# Patient Record
Sex: FEMALE | Race: BLACK OR AFRICAN AMERICAN | NOT HISPANIC OR LATINO | Employment: OTHER | URBAN - METROPOLITAN AREA
[De-identification: names, ages, dates, MRNs, and addresses within clinical notes are randomized per-mention and may not be internally consistent; named-entity substitution may affect disease eponyms.]

---

## 2024-05-03 ENCOUNTER — APPOINTMENT (OUTPATIENT)
Dept: SURGICAL ONCOLOGY | Facility: HOSPITAL | Age: 39
End: 2024-05-03
Payer: MEDICAID

## 2024-05-03 ENCOUNTER — HOSPITAL ENCOUNTER (OUTPATIENT)
Dept: RADIOLOGY | Facility: EXTERNAL LOCATION | Age: 39
Discharge: HOME | End: 2024-05-03
Payer: COMMERCIAL

## 2024-05-03 DIAGNOSIS — R92.8 ABNORMAL FINDING ON BREAST IMAGING: Primary | ICD-10-CM

## 2024-05-05 NOTE — PROGRESS NOTES
[facility]  Jennifer Oconnell female   1985 38 y.o.  [id number]      Chief Complaint  New patient, biopsy consultation.    History Of Present Illness  Jennifer Oconnell is a very pleasant 38 y.o. AA female seen in the breast center for biopsy consultation. States right breast tenderness began in February and the the last week she noticed the area become red and tender. She has not attempted to treat the area with over the counter measures. Denies fevers or chills, denies drainage. She denies breast surgery or biopsy. She has no family history of breast cancer.     BREAST IMAGIN2024 Bilateral diagnostic mammogram and right breast ultrasound, BI-RADS Category 3, 7 :00 10 cm from the nipple,  5.8 x 5.4 x 4.7 cm, irregular heterogeneous collection with mobile debris with two prominent lymph nodes in the right axilla with 4 mm of cortical thickening. 2 morphologically normal appearing lymph nodes indicating probably benign probable abscess formation in the right breast at the site of patient's painful palpable lump with likely reactive right axillary lymphadenopathy, warranting Short-term follow-up ultrasound in 3 months.    REPRODUCTIVE HISTORY: menarche age 10, , first birth age 19, idd not breastfeed, no OCP's, premenopausal, LMP 2024, scattered fibroglandular tissue     FAMILY CANCER HISTORY:   No family history of cancers    Review of Systems  Constitutional:  Negative for appetite change, fatigue, fever and unexpected weight change.   HENT:  Negative for ear pain, hearing loss, nosebleeds, sore throat and trouble swallowing.    Eyes:  Negative for discharge, itching and visual disturbance.   Breast: As stated in HPI.  Respiratory:  Negative for cough, chest tightness and shortness of breath.    Cardiovascular:  Negative for chest pain, palpitations and leg swelling.   Gastrointestinal:  Negative for abdominal pain, constipation, diarrhea and nausea.   Endocrine: Negative for cold  intolerance and heat intolerance.   Genitourinary:  Negative for dysuria, frequency, hematuria, pelvic pain and vaginal bleeding.   Musculoskeletal:  Negative for arthralgias, back pain, gait problem, joint swelling and myalgias.   Skin:  Negative for color change and rash.   Allergic/Immunologic: Negative for environmental allergies and food allergies.   Neurological:  Negative for dizziness, tremors, speech difficulty, weakness, numbness and headaches.   Hematological:  Does not bruise/bleed easily.   Psychiatric/Behavioral:  Negative for agitation, dysphoric mood and sleep disturbance. The patient is not nervous/anxious.       Past Medical History  She has a past medical history of Other specified health status.    Surgical History  She has a past surgical history that includes Other surgical history (05/11/2022) and Other surgical history (05/11/2022).    Family History  Cancer-related family history is not on file.     Social History  She reports that she has never smoked. She has never been exposed to tobacco smoke. She has never used smokeless tobacco. No history on file for alcohol use and drug use.    Allergies  Patient has no known allergies.    Medications  Current Outpatient Medications   Medication Instructions    doxycycline (VIBRA-TABS) 100 mg, oral, 2 times daily, Take with a full glass of water and do not lie down for at least 30 minutes after.       Last Recorded Vitals  Vitals:    05/07/24 0943   BP: 138/87   Pulse: 64   Resp: 17   Temp: 37 °C (98.6 °F)       Physical Exam  Physical Exam  Chest:           Patient is alert and oriented x3 and in a relaxed and appropriate mood. Her gait is steady and hand grasps are equal. Sclera is clear. The breasts are nearly symmetrical. The right breast, 3:00-9:00 thickened tissue and erythema with fluctuance, 8 cm x 10 cm.  The rest of the tissue is soft without palpable abnormalities, discrete nodules or masses. The left skin and nipple appear normal. There  is no cervical, supraclavicular or axillary lymphadenopathy. Heart rate and rhythm normal, S1 and S2 appreciated. The lungs are clear to auscultation bilaterally. Abdomen is soft and non-tender.     Procedure:  The right breast inferior central was prepped with alcohol. The site was verbally confirmed with the patient. Lidocaine 1% without epi 0.6 ml to site was injected. The site was then prepped with chlorhexidine. An 18g needle was used to access the fluid collection. Significant tenderness to right breast abscess. We discussed pro's and con's with aspiration. We ultimately proceeded with aspiration of the area. 20 ml of purulent drainage was removed and sent for culture. Patient tolerated the procedure without difficulty and the needle site was dressed with dry dressing.        Relevant Results and Imaging    I explained the results in depth, along with suggested explanation for follow up recommendations based on the testing results. BI-RADS Category 3        Visit Diagnosis  1. Abnormal finding on breast imaging  BI mammo bilateral diagnostic tomosynthesis    BI US breast limited right    BI US guided breast localization and biopsy right    Clinic Appointment Request    Clinic Appointment Request (with u/s)    BI US breast limited right    doxycycline (Vibra-Tabs) 100 mg tablet    Tissue/Wound Culture/Smear    Tissue/Wound Culture/Smear    Tissue/Wound Culture/Smear    Tissue/Wound Culture/Smear      2. Mass of right breast, unspecified quadrant  BI mammo bilateral diagnostic tomosynthesis    BI US breast limited right    BI US guided breast localization and biopsy right    Clinic Appointment Request    Clinic Appointment Request (with u/s)    BI US breast limited right    doxycycline (Vibra-Tabs) 100 mg tablet    Tissue/Wound Culture/Smear    Tissue/Wound Culture/Smear    Tissue/Wound Culture/Smear    Tissue/Wound Culture/Smear      3. Abscess              Assessment/Plan    Discussed possible  infection/inflammatory process. Agreed on antibiotics. She will start Doxycycline 100 mg PO BID x 14 days.  Breast pain education provided. Encouraged warm compresses three times a day for 20 minutes each. Patient will follow up in 1 week.  Will follow up with US for resolution in 3 months.     Patient Discussion/Summary   Your clinical examination and imaging are stable. Please return in 1 week for an office visit or sooner if you have any problems or concerns.     You can see your health information, review clinical summaries from office visits & test results online when you follow your health with MY  Chart, a personal health record. To sign up go to [email or web address]. If you need assistance with signing up or trouble getting into your account call [employer] Patient Line 24/7 at [phone].    My office phone number is [phone] if you need to get in touch with me or have additional questions or concerns. Thank you for choosing [facility] and trusting me as your healthcare provider. I look forward to seeing you again at your next office visit. I am honored to be a provider on your health care team and I remain dedicated to helping you achieve your health goals.    Meghan Downs, APRN-CNP

## 2024-05-06 ENCOUNTER — HOSPITAL ENCOUNTER (OUTPATIENT)
Dept: RADIOLOGY | Facility: EXTERNAL LOCATION | Age: 39
Discharge: HOME | End: 2024-05-06
Payer: COMMERCIAL

## 2024-05-06 DIAGNOSIS — R92.8 ABNORMAL FINDING ON BREAST IMAGING: ICD-10-CM

## 2024-05-06 PROBLEM — N63.10 MASS OF RIGHT BREAST: Status: ACTIVE | Noted: 2024-05-06

## 2024-05-07 ENCOUNTER — HOSPITAL ENCOUNTER (OUTPATIENT)
Dept: RADIOLOGY | Facility: HOSPITAL | Age: 39
Discharge: HOME | End: 2024-05-07
Payer: MEDICAID

## 2024-05-07 ENCOUNTER — OFFICE VISIT (OUTPATIENT)
Dept: SURGICAL ONCOLOGY | Facility: HOSPITAL | Age: 39
End: 2024-05-07
Payer: MEDICAID

## 2024-05-07 VITALS
WEIGHT: 214.29 LBS | SYSTOLIC BLOOD PRESSURE: 138 MMHG | BODY MASS INDEX: 40.46 KG/M2 | DIASTOLIC BLOOD PRESSURE: 87 MMHG | HEIGHT: 61 IN | HEART RATE: 64 BPM | RESPIRATION RATE: 17 BRPM | TEMPERATURE: 98.6 F

## 2024-05-07 VITALS — HEIGHT: 61 IN | WEIGHT: 214 LBS | BODY MASS INDEX: 40.4 KG/M2

## 2024-05-07 DIAGNOSIS — N63.10 MASS OF RIGHT BREAST, UNSPECIFIED QUADRANT: ICD-10-CM

## 2024-05-07 DIAGNOSIS — R92.8 ABNORMAL FINDING ON BREAST IMAGING: Primary | ICD-10-CM

## 2024-05-07 DIAGNOSIS — L02.91 ABSCESS: ICD-10-CM

## 2024-05-07 DIAGNOSIS — R92.8 ABNORMAL FINDING ON BREAST IMAGING: ICD-10-CM

## 2024-05-07 PROCEDURE — 77066 DX MAMMO INCL CAD BI: CPT | Performed by: STUDENT IN AN ORGANIZED HEALTH CARE EDUCATION/TRAINING PROGRAM

## 2024-05-07 PROCEDURE — 76982 USE 1ST TARGET LESION: CPT | Mod: RT

## 2024-05-07 PROCEDURE — 99204 OFFICE O/P NEW MOD 45 MIN: CPT

## 2024-05-07 PROCEDURE — 99214 OFFICE O/P EST MOD 30 MIN: CPT

## 2024-05-07 PROCEDURE — 77062 BREAST TOMOSYNTHESIS BI: CPT

## 2024-05-07 PROCEDURE — 87070 CULTURE OTHR SPECIMN AEROBIC: CPT

## 2024-05-07 PROCEDURE — 10021 FNA BX W/O IMG GDN 1ST LES: CPT

## 2024-05-07 PROCEDURE — 76642 ULTRASOUND BREAST LIMITED: CPT | Mod: RT

## 2024-05-07 PROCEDURE — 1036F TOBACCO NON-USER: CPT

## 2024-05-07 PROCEDURE — 77062 BREAST TOMOSYNTHESIS BI: CPT | Performed by: STUDENT IN AN ORGANIZED HEALTH CARE EDUCATION/TRAINING PROGRAM

## 2024-05-07 PROCEDURE — 76642 ULTRASOUND BREAST LIMITED: CPT | Performed by: STUDENT IN AN ORGANIZED HEALTH CARE EDUCATION/TRAINING PROGRAM

## 2024-05-07 RX ORDER — DOXYCYCLINE HYCLATE 100 MG
100 TABLET ORAL 2 TIMES DAILY
Qty: 28 TABLET | Refills: 0 | Status: SHIPPED | OUTPATIENT
Start: 2024-05-07 | End: 2024-05-21

## 2024-05-07 ASSESSMENT — PAIN SCALES - GENERAL: PAINLEVEL: 10-WORST PAIN EVER

## 2024-05-07 NOTE — PATIENT INSTRUCTIONS
Discussed possible infection/inflammatory process. Agreed on antibiotics. She will start Doxycycline 100 mg PO BID x 14 days.  Breast pain education provided. Encouraged warm compresses three times a day for 20 minutes each. Patient will follow up in 1 week.  Will follow up with US for resolution in 3 months.     Patient Discussion/Summary   Your clinical examination and imaging are stable. Please return in 1 week for an office visit or sooner if you have any problems or concerns.     You can see your health information, review clinical summaries from office visits & test results online when you follow your health with MY  Chart, a personal health record. To sign up go to [email or web address]. If you need assistance with signing up or trouble getting into your account call [employer] Patient Line 24/7 at [phone].    My office phone number is [phone] if you need to get in touch with me or have additional questions or concerns. Thank you for choosing [facility] and trusting me as your healthcare provider. I look forward to seeing you again at your next office visit. I am honored to be a provider on your health care team and I remain dedicated to helping you achieve your health goals.

## 2024-05-10 LAB
BACTERIA SPEC CULT: ABNORMAL
GRAM STN SPEC: ABNORMAL
GRAM STN SPEC: ABNORMAL

## 2024-05-10 NOTE — PROGRESS NOTES
[facility]  Jennifer Oconnell female   1985 38 y.o.  [id number]      Chief Complaint  Abscess follow up     History Of Present Illness  Jennifer Oconnell is a very pleasant 38 y.o. AA female seen in the breast center for right breast. Today, she states right breast abscess has improved and draining serous drainage from FNA site. Denies pain. Has been taking doxycycline as prescribed. Stated right breast tenderness began in February and the the last week she noticed the area become red and tender. She has not attempted to treat the area with over the counter measures. Denies fevers or chills, denies drainage. She denies breast surgery or biopsy. She has no family history of breast cancer.     BREAST IMAGIN2024 Bilateral diagnostic mammogram and right breast ultrasound, BI-RADS Category 3, 7 :00 10 cm from the nipple,  5.8 x 5.4 x 4.7 cm, irregular heterogeneous collection with mobile debris with two prominent lymph nodes in the right axilla with 4 mm of cortical thickening. 2 morphologically normal appearing lymph nodes indicating probably benign probable abscess formation in the right breast at the site of patient's painful palpable lump with likely reactive right axillary lymphadenopathy, warranting Short-term follow-up ultrasound in 3 months.    REPRODUCTIVE HISTORY: menarche age 10, , first birth age 19, idd not breastfeed, no OCP's, premenopausal, LMP 2024, scattered fibroglandular tissue     FAMILY CANCER HISTORY:   No family history of cancers    Review of Systems  Constitutional:  Negative for appetite change, fatigue, fever and unexpected weight change.   HENT:  Negative for ear pain, hearing loss, nosebleeds, sore throat and trouble swallowing.    Eyes:  Negative for discharge, itching and visual disturbance.   Breast: As stated in HPI.  Respiratory:  Negative for cough, chest tightness and shortness of breath.    Cardiovascular:  Negative for chest pain, palpitations and leg  swelling.   Gastrointestinal:  Negative for abdominal pain, constipation, diarrhea and nausea.   Endocrine: Negative for cold intolerance and heat intolerance.   Genitourinary:  Negative for dysuria, frequency, hematuria, pelvic pain and vaginal bleeding.   Musculoskeletal:  Negative for arthralgias, back pain, gait problem, joint swelling and myalgias.   Skin:  Negative for color change and rash.   Allergic/Immunologic: Negative for environmental allergies and food allergies.   Neurological:  Negative for dizziness, tremors, speech difficulty, weakness, numbness and headaches.   Hematological:  Does not bruise/bleed easily.   Psychiatric/Behavioral:  Negative for agitation, dysphoric mood and sleep disturbance. The patient is not nervous/anxious.       Past Medical History  She has a past medical history of Other specified health status.    Surgical History  She has a past surgical history that includes Other surgical history (05/11/2022) and Other surgical history (05/11/2022).    Family History  Cancer-related family history is not on file.     Social History  She reports that she has never smoked. She has never been exposed to tobacco smoke. She has never used smokeless tobacco. No history on file for alcohol use and drug use.    Allergies  Patient has no known allergies.    Medications  Current Outpatient Medications   Medication Instructions    doxycycline (VIBRA-TABS) 100 mg, oral, 2 times daily, Take with a full glass of water and do not lie down for at least 30 minutes after.       Last Recorded Vitals  Vitals:    05/14/24 1053   BP: 131/85   Pulse: 70         Physical Exam  Physical Exam  Chest:           Patient is alert and oriented x3 and in a relaxed and appropriate mood. Her gait is steady and hand grasps are equal. Sclera is clear. The breasts are nearly symmetrical. The right breast, 4:00-7:00 slightly thickened tissue with pealing of the skin, 6 x 7 cm, serous drainage present from FNA site that  was done last Tuesday.  The rest of the tissue is soft without palpable abnormalities, discrete nodules or masses. The left skin and nipple appear normal. There is no cervical, supraclavicular or axillary lymphadenopathy. Heart rate and rhythm normal, S1 and S2 appreciated. The lungs are clear to auscultation bilaterally. Abdomen is soft and non-tender.          Relevant Results and Imaging    I explained the results in depth, along with suggested explanation for follow up recommendations based on the testing results. BI-RADS Category 3        Visit Diagnosis  1. Abscess of right breast        2. Mass of right breast, unspecified quadrant  Clinic Appointment Request    Clinic Appointment Request              Assessment/Plan    Continue Doxycycline 100 mg PO BID to complete 14 day regimen.  Breast pain education provided. Patient will follow up in 1 week.  Will follow up with US for resolution in 3 months.     Patient Discussion/Summary   Your clinical examination and imaging are stable. Please return in 1 week for an office visit or sooner if you have any problems or concerns.     You can see your health information, review clinical summaries from office visits & test results online when you follow your health with MY  Chart, a personal health record. To sign up go to [email or web address]. If you need assistance with signing up or trouble getting into your account call [employer] Patient Line 24/7 at [phone].    My office phone number is [phone] if you need to get in touch with me or have additional questions or concerns. Thank you for choosing [facility] and trusting me as your healthcare provider. I look forward to seeing you again at your next office visit. I am honored to be a provider on your health care team and I remain dedicated to helping you achieve your health goals.    Meghan Downs, APRN-CNP

## 2024-05-14 ENCOUNTER — OFFICE VISIT (OUTPATIENT)
Dept: SURGICAL ONCOLOGY | Facility: HOSPITAL | Age: 39
End: 2024-05-14
Payer: MEDICAID

## 2024-05-14 ENCOUNTER — APPOINTMENT (OUTPATIENT)
Dept: SURGICAL ONCOLOGY | Facility: HOSPITAL | Age: 39
End: 2024-05-14
Payer: COMMERCIAL

## 2024-05-14 VITALS
SYSTOLIC BLOOD PRESSURE: 131 MMHG | WEIGHT: 218 LBS | DIASTOLIC BLOOD PRESSURE: 85 MMHG | BODY MASS INDEX: 41.19 KG/M2 | HEART RATE: 70 BPM

## 2024-05-14 DIAGNOSIS — N61.1 ABSCESS OF RIGHT BREAST: Primary | ICD-10-CM

## 2024-05-14 DIAGNOSIS — N63.10 MASS OF RIGHT BREAST, UNSPECIFIED QUADRANT: ICD-10-CM

## 2024-05-14 PROCEDURE — 99213 OFFICE O/P EST LOW 20 MIN: CPT

## 2024-05-14 ASSESSMENT — PAIN SCALES - GENERAL: PAINLEVEL: 4

## 2024-05-14 NOTE — PATIENT INSTRUCTIONS
Continue Doxycycline 100 mg PO BID to complete 14 day regimen.  Breast pain education provided. Patient will follow up in 1 week.  Will follow up with US for resolution in 3 months.     Patient Discussion/Summary   Your clinical examination and imaging are stable. Please return in 1 week for an office visit or sooner if you have any problems or concerns.     You can see your health information, review clinical summaries from office visits & test results online when you follow your health with MY  Chart, a personal health record. To sign up go to [email or web address]. If you need assistance with signing up or trouble getting into your account call [employer] Patient Line 24/7 at [phone].    My office phone number is [phone] if you need to get in touch with me or have additional questions or concerns. Thank you for choosing [facility] and trusting me as your healthcare provider. I look forward to seeing you again at your next office visit. I am honored to be a provider on your health care team and I remain dedicated to helping you achieve your health goals.

## 2024-05-18 NOTE — PROGRESS NOTES
[facility]  Jennifer Oconnell female   1985 38 y.o.  [id number]      Chief Complaint  Abscess follow up     History Of Present Illness  Jennifer Oconnell is a very pleasant 38 y.o. AA female seen in the breast center for right breast. Today, she states right breast abscess has improved and draining serous drainage from FNA site. Denies pain. Has been taking doxycycline as prescribed. Stated right breast tenderness began in February and the the last week she noticed the area become red and tender. She has not attempted to treat the area with over the counter measures. Denies fevers or chills, denies drainage. She denies breast surgery or biopsy. She has no family history of breast cancer.     BREAST IMAGIN2024 Bilateral diagnostic mammogram and right breast ultrasound, BI-RADS Category 3, 7 :00 10 cm from the nipple,  5.8 x 5.4 x 4.7 cm, irregular heterogeneous collection with mobile debris with two prominent lymph nodes in the right axilla with 4 mm of cortical thickening. 2 morphologically normal appearing lymph nodes indicating probably benign probable abscess formation in the right breast at the site of patient's painful palpable lump with likely reactive right axillary lymphadenopathy, warranting Short-term follow-up ultrasound in 3 months.    REPRODUCTIVE HISTORY: menarche age 10, , first birth age 19, idd not breastfeed, no OCP's, premenopausal, LMP 2024, scattered fibroglandular tissue     FAMILY CANCER HISTORY:   No family history of cancers    Review of Systems  Constitutional:  Negative for appetite change, fatigue, fever and unexpected weight change.   HENT:  Negative for ear pain, hearing loss, nosebleeds, sore throat and trouble swallowing.    Eyes:  Negative for discharge, itching and visual disturbance.   Breast: As stated in HPI.  Respiratory:  Negative for cough, chest tightness and shortness of breath.    Cardiovascular:  Negative for chest pain, palpitations and leg  swelling.   Gastrointestinal:  Negative for abdominal pain, constipation, diarrhea and nausea.   Endocrine: Negative for cold intolerance and heat intolerance.   Genitourinary:  Negative for dysuria, frequency, hematuria, pelvic pain and vaginal bleeding.   Musculoskeletal:  Negative for arthralgias, back pain, gait problem, joint swelling and myalgias.   Skin:  Negative for color change and rash.   Allergic/Immunologic: Negative for environmental allergies and food allergies.   Neurological:  Negative for dizziness, tremors, speech difficulty, weakness, numbness and headaches.   Hematological:  Does not bruise/bleed easily.   Psychiatric/Behavioral:  Negative for agitation, dysphoric mood and sleep disturbance. The patient is not nervous/anxious.       Past Medical History  She has a past medical history of Other specified health status.    Surgical History  She has a past surgical history that includes Other surgical history (05/11/2022) and Other surgical history (05/11/2022).    Family History  Cancer-related family history is not on file.     Social History  She reports that she has never smoked. She has never been exposed to tobacco smoke. She has never used smokeless tobacco. No history on file for alcohol use and drug use.    Allergies  Patient has no known allergies.    Medications  Current Outpatient Medications   Medication Instructions    doxycycline (VIBRA-TABS) 100 mg, oral, 2 times daily, Take with a full glass of water and do not lie down for at least 30 minutes after.       Last Recorded Vitals  There were no vitals filed for this visit.        Physical Exam  Physical Exam  Chest:           Patient is alert and oriented x3 and in a relaxed and appropriate mood. Her gait is steady and hand grasps are equal. Sclera is clear. The breasts are nearly symmetrical. The right breast, 4:00-7:00 slightly thickened tissue with pealing of the skin, 6 x 7 cm, serous drainage present from FNA site that was done  last Tuesday.  The rest of the tissue is soft without palpable abnormalities, discrete nodules or masses. The left skin and nipple appear normal. There is no cervical, supraclavicular or axillary lymphadenopathy. Heart rate and rhythm normal, S1 and S2 appreciated. The lungs are clear to auscultation bilaterally. Abdomen is soft and non-tender.          Relevant Results and Imaging    I explained the results in depth, along with suggested explanation for follow up recommendations based on the testing results. BI-RADS Category 3        Visit Diagnosis  No diagnosis found.          Assessment/Plan    Continue Doxycycline 100 mg PO BID to complete 14 day regimen.  Breast pain education provided. Patient will follow up in 1 week.  Will follow up with US for resolution in 3 months.     Patient Discussion/Summary   Your clinical examination and imaging are stable. Please return in 1 week for an office visit or sooner if you have any problems or concerns.     You can see your health information, review clinical summaries from office visits & test results online when you follow your health with MY  Chart, a personal health record. To sign up go to [email or web address]. If you need assistance with signing up or trouble getting into your account call [employer] Patient Line 24/7 at [phone].    My office phone number is [phone] if you need to get in touch with me or have additional questions or concerns. Thank you for choosing [facility] and trusting me as your healthcare provider. I look forward to seeing you again at your next office visit. I am honored to be a provider on your health care team and I remain dedicated to helping you achieve your health goals.    Meghan Downs, DILLON-CNP

## 2024-05-21 ENCOUNTER — APPOINTMENT (OUTPATIENT)
Dept: SURGICAL ONCOLOGY | Facility: HOSPITAL | Age: 39
End: 2024-05-21
Payer: MEDICAID

## 2024-08-06 ENCOUNTER — APPOINTMENT (OUTPATIENT)
Dept: SURGICAL ONCOLOGY | Facility: HOSPITAL | Age: 39
End: 2024-08-06
Payer: COMMERCIAL

## 2024-08-06 ENCOUNTER — APPOINTMENT (OUTPATIENT)
Dept: RADIOLOGY | Facility: HOSPITAL | Age: 39
End: 2024-08-06
Payer: COMMERCIAL

## 2024-08-06 ENCOUNTER — APPOINTMENT (OUTPATIENT)
Dept: RADIOLOGY | Facility: CLINIC | Age: 39
End: 2024-08-06
Payer: COMMERCIAL